# Patient Record
Sex: MALE | Race: BLACK OR AFRICAN AMERICAN | NOT HISPANIC OR LATINO | Employment: UNEMPLOYED | URBAN - METROPOLITAN AREA
[De-identification: names, ages, dates, MRNs, and addresses within clinical notes are randomized per-mention and may not be internally consistent; named-entity substitution may affect disease eponyms.]

---

## 2021-11-17 ENCOUNTER — MEDICAL CORRESPONDENCE (OUTPATIENT)
Dept: NEUROSURGERY | Facility: CLINIC | Age: 41
End: 2021-11-17

## 2021-11-24 ENCOUNTER — OFFICE VISIT (OUTPATIENT)
Dept: NEUROSURGERY | Facility: CLINIC | Age: 41
End: 2021-11-24
Payer: COMMERCIAL

## 2021-11-24 VITALS
HEART RATE: 60 BPM | WEIGHT: 215 LBS | SYSTOLIC BLOOD PRESSURE: 122 MMHG | HEIGHT: 70 IN | OXYGEN SATURATION: 97 % | DIASTOLIC BLOOD PRESSURE: 83 MMHG | BODY MASS INDEX: 30.78 KG/M2

## 2021-11-24 DIAGNOSIS — M48.02 SPINAL STENOSIS IN CERVICAL REGION: Primary | ICD-10-CM

## 2021-11-24 PROCEDURE — 99203 OFFICE O/P NEW LOW 30 MIN: CPT | Performed by: SURGERY

## 2021-11-24 RX ORDER — LEVALBUTEROL TARTRATE 45 UG/1
2 AEROSOL, METERED ORAL EVERY 4 HOURS PRN
COMMUNITY

## 2021-11-24 RX ORDER — ATORVASTATIN CALCIUM 10 MG/1
10 TABLET, FILM COATED ORAL
COMMUNITY

## 2021-11-24 ASSESSMENT — MIFFLIN-ST. JEOR: SCORE: 1868.54

## 2021-11-24 NOTE — PROGRESS NOTES
The the patient is a 41-year-old male.  He is a DOC patient.  He is here with 2 guards.  He was injured in July.  He was unconscious for few minutes.  He now has neck pain with symptoms going into both arms.  He gets tingling in his arms and hands.  He gets numbness.  He gets weakness, especially when he looks down. Symptoms are equal bilaterally.  He does not have trouble with his legs.  No issues with gait or balance.  No trouble with  bladder or bowel control.  On exam I do not find clonus or Babinski signs.  On MRI he has some central canal stenosis, to me not as bad as the radiologist reading.  The radiologist reads severe but I would be more inclined to say mild.  He also has foramen stenosis at multiple levels.  I would like to get some cervical spine films including oblique views and flexion-extension views.  I would like to get an EMG to be sure that were not dealing with  carpal tunnel or ulnar neuropathy and to pick cervical roots that need foraminotomies.  I would exhaust conservative treatment at the spine center.  I did show the MRI pictures to the patient.  He is satisfied with the plan.  Total time 30 minutes, more than 50% spent counseling and/or coordinating care.

## 2021-11-24 NOTE — NURSING NOTE
Neurosurgery consultation was requested by: LULA butcher   Pain: minor neck pain   Radicular Pain is present: bilateral arm pain   Lhermitte sign: no  Motor complaints: denies weakness  Sensory complaints: numbness in both hands , dropping items.   Gait and balance issues: yes   Bowel or bladder issues: denies   Duration of SX is: 4 months   The symptoms are worse with: activity   The symptoms are better with: rest  Injury: pt fell while playing basketball   Severity is: mild - moderate  Patient has tried the following conservative measures: none   NDI score is :   JSKAREN vizcarra

## 2021-11-24 NOTE — LETTER
11/24/2021         RE: Ino Rosa  [place] [id number] [facility] [address]        Dear Colleague,    Thank you for referring your patient, Ino Rosa, to the [facility]IC [facility]. Please see a copy of my visit note below.    The the patient is a 41-year-old male.  He is a DOC patient.  He is here with 2 guards.  He was injured in July.  He was unconscious for few minutes.  He now has neck pain with symptoms going into both arms.  He gets tingling in his arms and hands.  He gets numbness.  He gets weakness, especially when he looks down. Symptoms are equal bilaterally.  He does not have trouble with his legs.  No issues with gait or balance.  No trouble with  bladder or bowel control.  On exam I do not find clonus or Babinski signs.  On MRI he has some central canal stenosis, to me not as bad as the radiologist reading.  The radiologist reads severe but I would be more inclined to say mild.  He also has foramen stenosis at multiple levels.  I would like to get some cervical spine films including oblique views and flexion-extension views.  I would like to get an EMG to be sure that were not dealing with  carpal tunnel or ulnar neuropathy and to pick cervical roots that need foraminotomies.  I would exhaust conservative treatment at the spine center.  I did show the MRI pictures to the patient.  He is satisfied with the plan.  Total time 30 minutes, more than 50% spent counseling and/or coordinating care.      Again, thank you for allowing me to participate in the care of your patient.        Sincerely,        Parag Lacy MD

## 2022-01-03 ENCOUNTER — OFFICE VISIT (OUTPATIENT)
Dept: PHYSICAL MEDICINE AND REHAB | Facility: CLINIC | Age: 42
End: 2022-01-03
Payer: COMMERCIAL

## 2022-01-03 DIAGNOSIS — M48.02 SPINAL STENOSIS IN CERVICAL REGION: ICD-10-CM

## 2022-01-03 PROCEDURE — 95911 NRV CNDJ TEST 9-10 STUDIES: CPT | Performed by: PHYSICAL MEDICINE & REHABILITATION

## 2022-01-03 PROCEDURE — 95886 MUSC TEST DONE W/N TEST COMP: CPT | Mod: RT | Performed by: PHYSICAL MEDICINE & REHABILITATION

## 2022-01-03 NOTE — LETTER
1/3/2022         RE: Ino Rosa  [place] [id number] [facility] [address]        Dear Colleague,    Thank you for referring your patient, Ino Rosa, to the [facility] [place]. Please see a copy of my visit note below.    Patient presents at the request of Dr. Parag Lacy for a bilateral upper extremity EMG.  He has neck pain with bilateral hand numbness and tingling all digits involved mostly digits 4 and 5.  Right equal to left in severity.  This happened after an injury in July playing basketball where his feet were hit out from under him and he landed on his head.  This was while jumping.  Symptoms have improved some.  On exam he has normal sensation to light touch throughout the upper extremities, normal muscle strength of the major muscle groups of the upper extremities, and normal reflexes to the upper extremities with negative Boby's    EMG/NCS  results: Please see scanned full report    Comment NCS: Normal study  1.  Normal nerve conduction studies bilateral upper extremities.  This includes right median and ulnar SNAPs, bilateral median and ulnar transcarpal studies, and bilateral median and ulnar CMAPs.    Comment EMG: Normal study  1.  Normal needle EMG bilateral upper extremities.     Interpretation: Normal study    1. There is no electrodiagnostic evidence of cervical radiculopathy, brachial plexopathy, or focal neuropathy in the bilateral upper extremities.     Specifically, there is no electrodiagnostic evidence of ulnar neuropathy at the elbow or median neuropathy at the wrist in the bilateral upper extremities.    The testing was completed in its entirety by the physician.       It was our pleasure caring for your patient today, if there any questions or concerns please do not hesitate to contact us.        Again, thank you for allowing me to participate in the care of your patient.        Sincerely,        Dajuan Pedroza, DO

## 2022-01-03 NOTE — PATIENT INSTRUCTIONS
Thank you for choosing the [facility] for your EMG testing.    The ordering provider will receive your final EMG results within the next few days.  Please follow up with your provider for the results and further treatment recommendations.

## 2022-01-03 NOTE — PROGRESS NOTES
Patient presents at the request of Dr. Parag Lacy for a bilateral upper extremity EMG.  He has neck pain with bilateral hand numbness and tingling all digits involved mostly digits 4 and 5.  Right equal to left in severity.  This happened after an injury in July playing basketball where his feet were hit out from under him and he landed on his head.  This was while jumping.  Symptoms have improved some.  On exam he has normal sensation to light touch throughout the upper extremities, normal muscle strength of the major muscle groups of the upper extremities, and normal reflexes to the upper extremities with negative Boby's    EMG/NCS  results: Please see scanned full report    Comment NCS: Normal study  1.  Normal nerve conduction studies bilateral upper extremities.  This includes right median and ulnar SNAPs, bilateral median and ulnar transcarpal studies, and bilateral median and ulnar CMAPs.    Comment EMG: Normal study  1.  Normal needle EMG bilateral upper extremities.     Interpretation: Normal study    1. There is no electrodiagnostic evidence of cervical radiculopathy, brachial plexopathy, or focal neuropathy in the bilateral upper extremities.     Specifically, there is no electrodiagnostic evidence of ulnar neuropathy at the elbow or median neuropathy at the wrist in the bilateral upper extremities.    The testing was completed in its entirety by the physician.       It was our pleasure caring for your patient today, if there any questions or concerns please do not hesitate to contact us.

## 2022-03-08 ENCOUNTER — ANCILLARY PROCEDURE (OUTPATIENT)
Dept: RADIOLOGY | Facility: CLINIC | Age: 42
End: 2022-03-08
Payer: COMMERCIAL

## 2022-03-09 ENCOUNTER — OFFICE VISIT (OUTPATIENT)
Dept: NEUROSURGERY | Facility: CLINIC | Age: 42
End: 2022-03-09
Payer: COMMERCIAL

## 2022-03-09 VITALS
OXYGEN SATURATION: 96 % | DIASTOLIC BLOOD PRESSURE: 83 MMHG | HEART RATE: 62 BPM | HEIGHT: 70 IN | SYSTOLIC BLOOD PRESSURE: 140 MMHG | WEIGHT: 213 LBS | BODY MASS INDEX: 30.49 KG/M2

## 2022-03-09 DIAGNOSIS — R52 PAIN: Primary | ICD-10-CM

## 2022-03-09 PROCEDURE — 99213 OFFICE O/P EST LOW 20 MIN: CPT | Performed by: SURGERY

## 2022-03-09 NOTE — PROGRESS NOTES
The patient is a 41-year-old DOC patient.  He is here with 2 guards.  He complains of mild headaches and neck pain going out into his shoulders.  His hands are better.  He had some cervical spine films with oblique views and flexion-extension views.  He has cervical spondylosis especially at C3-4 and C6-7.  He does not sublux abnormally.  His EMG was negative including for carpal tunnel and ulnar nerve and cervical roots.  The patient and I discussed the foregoing.  He is satisfied with a conservative treatment plan.  Ordered.  Total time 15 minutes, more than 50% spent counseling and/or coordinating care.

## 2022-03-09 NOTE — LETTER
3/9/2022         RE: Ino Rosa  [place] [id number] [facility] [address]        Dear Colleague,    Thank you for referring your patient, Ino Rosa, to the [facility]. Please see a copy of my visit note below.    The patient is a 41-year-old DOC patient.  He is here with 2 guards.  He complains of mild headaches and neck pain going out into his shoulders.  His hands are better.  He had some cervical spine films with oblique views and flexion-extension views.  He has cervical spondylosis especially at C3-4 and C6-7.  He does not sublux abnormally.  His EMG was negative including for carpal tunnel and ulnar nerve and cervical roots.  The patient and I discussed the foregoing.  He is satisfied with a conservative treatment plan.  Ordered.  Total time 15 minutes, more than 50% spent counseling and/or coordinating care.      Again, thank you for allowing me to participate in the care of your patient.        Sincerely,        Parag Lacy MD

## 2022-03-09 NOTE — NURSING NOTE
Pt is a follow up for EMG. Pt has pain in his neck and both shoulders left is worse.pt has numbness and tingling both hands and left forearm. Pt denies any other symptoms.   Al Lawson MA

## 2022-06-14 ENCOUNTER — TRANSFERRED RECORDS (OUTPATIENT)
Dept: PHYSICAL MEDICINE AND REHAB | Facility: CLINIC | Age: 42
End: 2022-06-14

## 2022-07-15 NOTE — PROGRESS NOTES
ASSESSMENT: Ino Rosa is a 42 year old male with past medical history significant for uncomplicated asthma, type 2 diabetes mellitus, hyperlipidemia who presents today for new patient evaluation of chronic (right neck pain with radiation into the left greater than right upper extremity with associated numbness, tingling, weakness.  Pain began July 2021 after he fell while playing basketball, landing on his head and neck.  My review of an MRI cervical spine shows multilevel cervical spondylosis.  There is up to moderate to severe spinal canal stenosis C4-5.  There is multilevel foraminal stenosis which is severe right C3-4, moderate right and moderate to severe left C4-5, severe left and moderate to severe right C6-7, moderate left C7-T1.  EMG of bilateral upper extremities was normal.  Pain is most consistent with cervical radiculitis.  On exam he reported a sensory deficit left finger 4.  Otherwise, he was neurologically intact.        PLAN:  A shared decision making model was used.  The patient's values and choices were respected.  The following represents what was discussed and decided upon by the physician assistant and the patient.  2 guards were present with the patient.    1.  DIAGNOSTIC TESTS:    -I reviewed the MRI lumbar spine.  - I reviewed cervical spine flexion-extension x-rays.  - I reviewed the EMG bilateral upper extremities.  - No further diagnostic tests were ordered.    2.  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  He does home exercises.  No further physical therapy was ordered.    3.  MEDICATIONS: No changes are made to the patient's medications.  Patient takes naproxen and Tylenol about 5 times per week.  These medications are both helpful, but naproxen helps more.    4.  INTERVENTIONS:    - I offer the patient a C7-T1 interlaminar epidural steroid injection.  Patient indicated he would like to proceed and an order was placed.    5.  PATIENT  EDUCATION:  - I told the patient if he fails to improve with conservative treatment I would recommend following up with Dr. Lacy.    6.  FOLLOW-UP:   Patient should follow-up with me 2 weeks after his C7-T1 interlaminar epidural steroid injection.  If he has questions or concerns in the meantime, he should not hesitate to call.        SUBJECTIVE:  Ino Rosa  Is a 42 year old male who presents today in consultation at the request of Dr. Lacy for new patient evaluation of neck pain with radiation into the left greater than right upper extremity with associated numbness, tingling, weakness.  Patient reports that in July 2021 he fell while playing basketball.  He states that his legs were taken out from under him and he fell backwards, landing on his head and neck.  He did have a loss of consciousness.  He had immediate neck pain.  He reports he lost feeling in both arms which lasted for about 2 weeks with significant weakness.  He has had gradual improvement in numbness, tingling, and weakness since then, but symptoms persist.    Patient complains of left greater than right neck pain.  He states that initially he felt pain radiating down the triceps areas although that area does not bother him as much now.  He still experiences pain in the left greater than right radial forearm.  He has numbness and tingling in left greater than right fingers 1, 3, 4.  He feels weakness with left .  He experiences headaches associated with the neck pain about twice per week.  He has had some dizziness and difficulty concentrating since the injury as well.  Pain is aggravated with sleeping, especially on his left side and looking down for too long.  Patient states that he is an avid reader and the prolonged cervical flexion makes pain worse.  Pain is alleviated with resting the left arm.  He denies loss of bowel or bladder control.  Denies recent fevers, chills, sweats.    Patient gets physical therapy at his  correctional facility.  He believes he has had 2 sessions so far.  He does home exercise program.  He did not go to the chiropractor.  He has not had any cervical spine surgery or cervical spine injections.  He states he did have a lumbar spine injection last year which did not help.  He takes naproxen and Tylenol without 5 times per week.  His medications are both helpful.  Naproxen is more helpful than Tylenol.    Current Outpatient Medications   Medication     atorvastatin (LIPITOR) 10 MG tablet     ciclesonide (ALVESCO) 80 MCG/ACT inhaler     levalbuterol (XOPENEX HFA) 45 MCG/ACT inhaler     metFORMIN (GLUCOPHAGE) 500 MG tablet         No Known Allergies    Past Medical History:   Diagnosis Date     Chronic pain      Hyperlipidemia      Type 2 diabetes mellitus (H)      Uncomplicated asthma         Surgical history: ORIF arm, dental work    Family history: Grandmother had cancer, mother had diabetes    Social history: Patient is a DOC patient.  He denies tobacco, alcohol, illicit drug use.    ROS: Positive for headache, eye pain, imbalance, dizziness, excessive tiredness.  Specifically negative for dysphagia, imbalance, fine motor skill difficulties, bowel/bladder dysfunction, fevers,chills, appetite changes, unexplained weight loss.   Otherwise 13 systems reviewed are negative.  Please see the patient's intake questionnaire from today for details.      OBJECTIVE:  PHYSICAL EXAMINATION:  CONSTITUTIONAL:  Vital signs as above.  No acute distress.  The patient is well nourished and well groomed.  Patient is cuffed at wrists and ankles.  PSYCHIATRIC:  The patient is awake, alert, oriented to person, place, time and answering questions appropriately with clear speech.    HEENT:  Normocephalic, atraumatic.  Sclera clear.    SKIN:  Skin over the face, bilateral upper extremities, and neck is clean, dry, intact without rashes.  LYMPH NODES:  No palpable or tender anterior/posterior cervical, submandibular, or  supraclavicular lymph nodes.    MUSCLE STRENGTH:  5/5 strength for the bilateral shoulder abductors, elbow flexors/extensors, wrist extensors, finger flexors/abductors.  NEURO:  CN III-XII are grossly intact.  2+ symmetric biceps, brachioradialis, triceps reflexes bilaterally.  Sensation to light touch subjectively diminished distal left finger 4.  Negative Hendricks's bilaterally.    VASCULAR:  2/4 radial pulses bilaterally.  Warm upper limbs bilaterally.  Capillary refill in the upper extremities is less than 1 second.  MUSCULOSKELETAL: Tender palpation midline at approximately C5.  Mild hypertonicity left upper trapezius muscles.  Cervical range of motion is mildly restricted with lateral rotation right, otherwise full.    RESULTS:    I reviewed the MRI cervical spine from [facility] dated July 22, 2022.  At C3-4 there is severe right foraminal stenosis.  At C4-5 there is moderate to severe spinal canal stenosis with flattening of the cord.  There is moderate right and mild to moderate left foraminal stenosis.  At C5-6 there is mild to moderate left foraminal stenosis.  At C6-7 there is moderate spinal canal stenosis with flattening of the left hemicord.  There is severe left and moderate to severe right foraminal stenosis.  At C7-T1 there is moderate left foraminal stenosis.    I reviewed flexion-extension x-rays of the cervical spine from [facility] dated March 8, 2022.  This shows straightening of normal cervical lordosis.  There is no pathologic subluxation.    EMG bilateral upper extremities January 3, 2022:  Comment NCS: Normal study  1.  Normal nerve conduction studies bilateral upper extremities.  This includes right median and ulnar SNAPs, bilateral median and ulnar transcarpal studies, and bilateral median and ulnar CMAPs.     Comment EMG: Normal study  1.  Normal needle EMG bilateral upper extremities.      Interpretation: Normal study     1. There is no electrodiagnostic evidence of cervical radiculopathy,  brachial plexopathy, or focal neuropathy in the bilateral upper extremities.     Specifically, there is no electrodiagnostic evidence of ulnar neuropathy at the elbow or median neuropathy at the wrist in the bilateral upper extremities.

## 2022-07-18 ENCOUNTER — MEDICAL CORRESPONDENCE (OUTPATIENT)
Dept: PHYSICAL MEDICINE AND REHAB | Facility: CLINIC | Age: 42
End: 2022-07-18

## 2022-07-18 ENCOUNTER — MEDICAL CORRESPONDENCE (OUTPATIENT)
Dept: HEALTH INFORMATION MANAGEMENT | Facility: CLINIC | Age: 42
End: 2022-07-18

## 2022-07-18 ENCOUNTER — OFFICE VISIT (OUTPATIENT)
Dept: PHYSICAL MEDICINE AND REHAB | Facility: CLINIC | Age: 42
End: 2022-07-18
Payer: COMMERCIAL

## 2022-07-18 VITALS
WEIGHT: 213 LBS | HEART RATE: 51 BPM | SYSTOLIC BLOOD PRESSURE: 132 MMHG | DIASTOLIC BLOOD PRESSURE: 72 MMHG | BODY MASS INDEX: 30.49 KG/M2 | HEIGHT: 70 IN

## 2022-07-18 DIAGNOSIS — R52 PAIN: ICD-10-CM

## 2022-07-18 DIAGNOSIS — M54.12 CERVICAL RADICULAR PAIN: Primary | ICD-10-CM

## 2022-07-18 PROCEDURE — 99204 OFFICE O/P NEW MOD 45 MIN: CPT | Performed by: PHYSICIAN ASSISTANT

## 2022-07-18 ASSESSMENT — PAIN SCALES - GENERAL: PAINLEVEL: MILD PAIN (2)

## 2022-07-18 NOTE — LETTER
7/18/2022         RE: Ino Rosa  [place] [id number] [facility] [id number]        Dear Colleague,    Thank you for referring your patient, Ino Rosa, to the [facility]. Please see a copy of my visit note below.    ASSESSMENT: Ino Rosa is a 42 year old male with past medical history significant for uncomplicated asthma, type 2 diabetes mellitus, hyperlipidemia who presents today for new patient evaluation of chronic (right neck pain with radiation into the left greater than right upper extremity with associated numbness, tingling, weakness.  Pain began July 2021 after he fell while playing basketball, landing on his head and neck.  My review of an MRI cervical spine shows multilevel cervical spondylosis.  There is up to moderate to severe spinal canal stenosis C4-5.  There is multilevel foraminal stenosis which is severe right C3-4, moderate right and moderate to severe left C4-5, severe left and moderate to severe right C6-7, moderate left C7-T1.  EMG of bilateral upper extremities was normal.  Pain is most consistent with cervical radiculitis.  On exam he reported a sensory deficit left finger 4.  Otherwise, he was neurologically intact.        PLAN:  A shared decision making model was used.  The patient's values and choices were respected.  The following represents what was discussed and decided upon by the physician assistant and the patient.  2 guards were present with the patient.    1.  DIAGNOSTIC TESTS:    -I reviewed the MRI lumbar spine.  - I reviewed cervical spine flexion-extension x-rays.  - I reviewed the EMG bilateral upper extremities.  - No further diagnostic tests were ordered.    2.  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  He does home exercises.  No further physical therapy was ordered.    3.  MEDICATIONS: No changes are made to the patient's medications.  Patient takes naproxen and Tylenol about  5 times per week.  These medications are both helpful, but naproxen helps more.    4.  INTERVENTIONS:    - I offer the patient a C7-T1 interlaminar epidural steroid injection.  Patient indicated he would like to proceed and an order was placed.    5.  PATIENT EDUCATION:  - I told the patient if he fails to improve with conservative treatment I would recommend following up with Dr. Lacy.    6.  FOLLOW-UP:   Patient should follow-up with me 2 weeks after his C7-T1 interlaminar epidural steroid injection.  If he has questions or concerns in the meantime, he should not hesitate to call.        SUBJECTIVE:  Ino Rosa  Is a 42 year old male who presents today in consultation at the request of Dr. Lacy for new patient evaluation of neck pain with radiation into the left greater than right upper extremity with associated numbness, tingling, weakness.  Patient reports that in July 2021 he fell while playing basketball.  He states that his legs were taken out from under him and he fell backwards, landing on his head and neck.  He did have a loss of consciousness.  He had immediate neck pain.  He reports he lost feeling in both arms which lasted for about 2 weeks with significant weakness.  He has had gradual improvement in numbness, tingling, and weakness since then, but symptoms persist.    Patient complains of left greater than right neck pain.  He states that initially he felt pain radiating down the triceps areas although that area does not bother him as much now.  He still experiences pain in the left greater than right radial forearm.  He has numbness and tingling in left greater than right fingers 1, 3, 4.  He feels weakness with left .  He experiences headaches associated with the neck pain about twice per week.  He has had some dizziness and difficulty concentrating since the injury as well.  Pain is aggravated with sleeping, especially on his left side and looking down for too long.  Patient states  that he is an avid reader and the prolonged cervical flexion makes pain worse.  Pain is alleviated with resting the left arm.  He denies loss of bowel or bladder control.  Denies recent fevers, chills, sweats.    Patient gets physical therapy at his correctional facility.  He believes he has had 2 sessions so far.  He does home exercise program.  He did not go to the chiropractor.  He has not had any cervical spine surgery or cervical spine injections.  He states he did have a lumbar spine injection last year which did not help.  He takes naproxen and Tylenol without 5 times per week.  His medications are both helpful.  Naproxen is more helpful than Tylenol.    Current Outpatient Medications   Medication     atorvastatin (LIPITOR) 10 MG tablet     ciclesonide (ALVESCO) 80 MCG/ACT inhaler     levalbuterol (XOPENEX HFA) 45 MCG/ACT inhaler     metFORMIN (GLUCOPHAGE) 500 MG tablet         No Known Allergies    Past Medical History:   Diagnosis Date     Chronic pain      Hyperlipidemia      Type 2 diabetes mellitus (H)      Uncomplicated asthma         Surgical history: ORIF arm, dental work    Family history: Grandmother had cancer, mother had diabetes    Social history: Patient is a [employer] patient.  He denies tobacco, alcohol, illicit drug use.    ROS: Positive for headache, eye pain, imbalance, dizziness, excessive tiredness.  Specifically negative for dysphagia, imbalance, fine motor skill difficulties, bowel/bladder dysfunction, fevers,chills, appetite changes, unexplained weight loss.   Otherwise 13 systems reviewed are negative.  Please see the patient's intake questionnaire from today for details.      OBJECTIVE:  PHYSICAL EXAMINATION:  CONSTITUTIONAL:  Vital signs as above.  No acute distress.  The patient is well nourished and well groomed.  Patient is cuffed at wrists and ankles.  PSYCHIATRIC:  The patient is awake, alert, oriented to person, place, time and answering questions appropriately with clear speech.     HEENT:  Normocephalic, atraumatic.  Sclera clear.    SKIN:  Skin over the face, bilateral upper extremities, and neck is clean, dry, intact without rashes.  LYMPH NODES:  No palpable or tender anterior/posterior cervical, submandibular, or supraclavicular lymph nodes.    MUSCLE STRENGTH:  5/5 strength for the bilateral shoulder abductors, elbow flexors/extensors, wrist extensors, finger flexors/abductors.  NEURO:  CN III-XII are grossly intact.  2+ symmetric biceps, brachioradialis, triceps reflexes bilaterally.  Sensation to light touch subjectively diminished distal left finger 4.  Negative Hendricks's bilaterally.    VASCULAR:  2/4 radial pulses bilaterally.  Warm upper limbs bilaterally.  Capillary refill in the upper extremities is less than 1 second.  MUSCULOSKELETAL: Tender palpation midline at approximately C5.  Mild hypertonicity left upper trapezius muscles.  Cervical range of motion is mildly restricted with lateral rotation right, otherwise full.    RESULTS:    I reviewed the MRI cervical spine from [facility] dated July 22, 2022.  At C3-4 there is severe right foraminal stenosis.  At C4-5 there is moderate to severe spinal canal stenosis with flattening of the cord.  There is moderate right and mild to moderate left foraminal stenosis.  At C5-6 there is mild to moderate left foraminal stenosis.  At C6-7 there is moderate spinal canal stenosis with flattening of the left hemicord.  There is severe left and moderate to severe right foraminal stenosis.  At C7-T1 there is moderate left foraminal stenosis.    I reviewed flexion-extension x-rays of the cervical spine from [facility] dated March 8, 2022.  This shows straightening of normal cervical lordosis.  There is no pathologic subluxation.    EMG bilateral upper extremities January 3, 2022:  Comment NCS: Normal study  1.  Normal nerve conduction studies bilateral upper extremities.  This includes right median and ulnar SNAPs, bilateral median and ulnar  transcarpal studies, and bilateral median and ulnar CMAPs.     Comment EMG: Normal study  1.  Normal needle EMG bilateral upper extremities.      Interpretation: Normal study     1. There is no electrodiagnostic evidence of cervical radiculopathy, brachial plexopathy, or focal neuropathy in the bilateral upper extremities.     Specifically, there is no electrodiagnostic evidence of ulnar neuropathy at the elbow or median neuropathy at the wrist in the bilateral upper extremities.             Again, thank you for allowing me to participate in the care of your patient.        Sincerely,        Odessa Alaniz PA-C

## 2022-08-31 ENCOUNTER — TRANSCRIBE ORDERS (OUTPATIENT)
Dept: PHYSICAL MEDICINE AND REHAB | Facility: CLINIC | Age: 42
End: 2022-08-31

## 2022-08-31 DIAGNOSIS — M54.12 CERVICAL RADICULAR PAIN: Primary | ICD-10-CM

## 2022-09-27 ENCOUNTER — RADIOLOGY INJECTION OFFICE VISIT (OUTPATIENT)
Dept: PHYSICAL MEDICINE AND REHAB | Facility: CLINIC | Age: 42
End: 2022-09-27
Attending: PHYSICIAN ASSISTANT
Payer: COMMERCIAL

## 2022-09-27 VITALS
RESPIRATION RATE: 16 BRPM | HEART RATE: 75 BPM | DIASTOLIC BLOOD PRESSURE: 68 MMHG | TEMPERATURE: 98.3 F | SYSTOLIC BLOOD PRESSURE: 126 MMHG | OXYGEN SATURATION: 98 %

## 2022-09-27 DIAGNOSIS — E11.9 DIABETES MELLITUS, TYPE 2 (H): Primary | ICD-10-CM

## 2022-09-27 DIAGNOSIS — M54.12 CERVICAL RADICULAR PAIN: ICD-10-CM

## 2022-09-27 LAB — GLUCOSE SERPL-MCNC: 139 MG/DL (ref 70–99)

## 2022-09-27 PROCEDURE — 62321 NJX INTERLAMINAR CRV/THRC: CPT | Performed by: PHYSICAL MEDICINE & REHABILITATION

## 2022-09-27 PROCEDURE — 82962 GLUCOSE BLOOD TEST: CPT | Performed by: PHYSICAL MEDICINE & REHABILITATION

## 2022-09-27 RX ORDER — LIDOCAINE HYDROCHLORIDE 10 MG/ML
INJECTION, SOLUTION EPIDURAL; INFILTRATION; INTRACAUDAL; PERINEURAL
Status: COMPLETED | OUTPATIENT
Start: 2022-09-27 | End: 2022-09-27

## 2022-09-27 RX ORDER — METHYLPREDNISOLONE ACETATE 80 MG/ML
INJECTION, SUSPENSION INTRA-ARTICULAR; INTRALESIONAL; INTRAMUSCULAR; SOFT TISSUE
Status: COMPLETED | OUTPATIENT
Start: 2022-09-27 | End: 2022-09-27

## 2022-09-27 RX ADMIN — METHYLPREDNISOLONE ACETATE 80 MG: 80 INJECTION, SUSPENSION INTRA-ARTICULAR; INTRALESIONAL; INTRAMUSCULAR; SOFT TISSUE at 14:27

## 2022-09-27 RX ADMIN — LIDOCAINE HYDROCHLORIDE 2.5 ML: 10 INJECTION, SOLUTION EPIDURAL; INFILTRATION; INTRACAUDAL; PERINEURAL at 14:26

## 2022-09-27 ASSESSMENT — PAIN SCALES - GENERAL
PAINLEVEL: MILD PAIN (3)
PAINLEVEL: MILD PAIN (2)

## 2022-09-27 NOTE — PATIENT INSTRUCTIONS
Follow-up visit with Odessa Alaniz in 2 weeks to discuss injection outcome and determine care plan going forward.    Please be advised that your blood glucose levels may be increased for the next 5-7 days as a result of the steroid you received with your injection today.  It is recommended that you monitor your blood glucose levels closely over the next week and direct any additional questions regarding your blood glucose management to your primary doctor.       DISCHARGE INSTRUCTIONS    During office hours (8:00 a.m.- 4:00 p.m.) questions or concerns may be answered  by calling [facility] Navigation Nurses at  [phone].  Messages received after hours will be returned the following business day.      In the case of an emergency, please dial 911 or seek assistance at the nearest Emergency Room/Urgent Care facility.     All Patients:    You may experience an increase in your symptoms for the first 2 days (It may take anywhere between 2 days- 2 weeks for the steroid to have maximum effect).    You may use ice on the injection site, as frequently as 20 minutes each hour if needed.    You may take your pain medicine.    You may continue taking your regular medication after your injection. If you have had a Medial Branch Block you may resume pain medication once your pain diary is completed.    You may shower. No swimming, tub bath or hot tub for 48 hours.  You may remove your bandaid/bandage as soon as you are home.    You may resume light activities, as tolerated.    Resume your usual diet as tolerated.    It is strongly advised that you do not drive for 1-3 hours post injection.    If you have had oral sedation:  Do not drive for 8 hours post injection.      If you have had IV sedation:  Do not drive for 24 hours post injection.  Do not operate hazardous machinery or make important personal/business decisions for 24 hours.      POSSIBLE STEROID SIDE EFFECTS (If steroid/cortisone was used for your procedure)    -If  you experience these symptoms, it should only last for a short period    Swelling of the legs              Skin redness (flushing)     Mouth (oral) irritation   Blood sugar (glucose) levels            Sweats                    Mood changes  Headache  Sleeplessness  Weakened immune system for up to 14 days, which could increase the risk of ashlee the COVID-19 virus and/or experiencing more severe symptoms of the disease, if exposed.  Decreased effectiveness of the flu vaccine if given within 2 weeks of the steroid.         POSSIBLE PROCEDURE SIDE EFFECTS  -Call the Spine Center if you are concerned  Increased Pain           Increased numbness/tingling      Nausea/Vomiting          Bruising/bleeding at site      Redness or swelling                                              Difficulty walking      Weakness           Fever greater than 100.5    *In the event of a severe headache after an epidural steroid injection that is relieved by lying down, please call the [facility] to speak with a clinical staff member*

## 2022-10-10 NOTE — PROGRESS NOTES
Assessment:   Ino Rosa is a 42 year old y.o. male with past medical history significant for uncomplicated asthma, type 2 diabetes mellitus, hyperlipidemia  who presents today for follow-up regarding  chronic left greater than right neck pain with radiation into the left greater than right upper extremity with associated numbness, tingling, weakness.  Pain began July 2021 after he fell while playing basketball, landing on his head and neck.  My review of an MRI cervical spine shows multilevel cervical spondylosis.  There is up to moderate to severe spinal canal stenosis C4-5.  There is multilevel foraminal stenosis which is severe right C3-4, moderate right and moderate to severe left C4-5, severe left and moderate to severe right C6-7, moderate left C7-T1.    Patient is status post a C7-T1 interlaminar epidural steroid injection September 27, 2022 which provided greater than 50% relief of his pain overall.  Arm symptoms have resolved completely.  He has minimal residual midline neck pain.  He is neuro intact.       Plan:     A shared decision making plan was used.  The patient's values and choices were respected.  The following represents what was discussed and decided upon by the physician assistant and the patient.   2 guards were present with the patient.    1.  DIAGNOSTIC TESTS:    -I reviewed the MRI lumbar spine.  - I reviewed cervical spine flexion-extension x-rays.  - I reviewed the EMG bilateral upper extremities.  - No further diagnostic tests were ordered.    2.  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  He does home exercises.  No further physical therapy was ordered.    3.  MEDICATIONS:  No changes are made to the patient's medications.  Patient takes naproxen approximately twice per week.  He has not needed any Tylenol since his injection.  - I did make a note that patient would benefit from having a memory foam pillow to support his neck at night.    4.   INTERVENTIONS: No additional interventions were ordered.  If pain returns, we could repeat the C7-T1 interlaminar epidural steroid injection.    5.  PATIENT EDUCATION: If pain ultimately fails to improve with nonsurgical management, would recommend following up with Dr. Lacy.    6.  FOLLOW-UP: Patient will follow up with me as needed.  If he has questions or concerns, he should not hesitate to call.    Subjective:     Ino Rosa is a 42 year old male who presents today for follow-up regarding neck pain with radiation into the left greater than right upper extremity with associated numbness, tingling, weakness.  Patient is following up after a C7-T1 interlaminar epidural steroid injection September 27, 2022.  Patient reports greater than 50% improvement in his pain.    Patient complains of minimal residual midline neck pain.  Patient reports that for the past 2 days his pain has been more severe because he is in a different cell where he has to constantly turn his neck to the right.  He states that the pain down the arms has resolved completely.  Numbness and tingling has resolved completely.  He no longer feels any weakness in his arms.  He is not dropping items.  He rates his pain today as a 3 out of 10.  At its worst it is an 8 of 10.  At its best it is a 0-10.  Pain is aggravated with looking in one direction for too long.  Pain is alleviated with stretches and movement.    Patient gets physical therapy at his correctional facility although this happens seldomly.  He does do his home exercises on his own every day.  He takes naproxen about twice per week.  He did need to take it both yesterday and today.  He has not taken any Tylenol since the injection.    Review of Systems:  Positive for pain much worse at night.  Negative for numbness/tingling, weakness, loss of bowel/bladder control, inability to urinate, headache, trip/stumble/falls, difficulty swallowing, difficulty with hand skills, fevers,  unintentional weight loss.     Objective:   CONSTITUTIONAL:  Vital signs as above.  No acute distress.  The patient is well nourished and well groomed.    PSYCHIATRIC:  The patient is awake, alert, oriented to person, place and time.  The patient is answering questions appropriately with clear speech.  Normal affect.  HEENT: Normocephalic, atraumatic.  Sclera clear.    LYMPH: No cervical lymphadenopathy  SKIN:  Skin over the face, neck bilateral upper extremities is clean, dry, intact without rashes.  MUSCULOSKELETAL: No tenderness to palpation midline cervical spine.  No tenderness palpation bilateral cervical paraspinous muscles.  No tenderness palpation bilateral upper trapezius muscles.  The patient has 5/5 strength for the bilateral shoulder abductors, elbow flexors/extensors, wrist extensors, finger flexors/abductors.   NEUROLOGICAL:    Negative Hendricks's bilaterally.  Sensation to light touch is intact bilateral upper extremities throughout.    RESULTS:    I reviewed the MRI cervical spine from [facility] dated July 22, 2022.  At C3-4 there is severe right foraminal stenosis.  At C4-5 there is moderate to severe spinal canal stenosis with flattening of the cord.  There is moderate right and mild to moderate left foraminal stenosis.  At C5-6 there is mild to moderate left foraminal stenosis.  At C6-7 there is moderate spinal canal stenosis with flattening of the left hemicord.  There is severe left and moderate to severe right foraminal stenosis.  At C7-T1 there is moderate left foraminal stenosis.     I reviewed flexion-extension x-rays of the cervical spine from [facility] dated March 8, 2022.  This shows straightening of normal cervical lordosis.  There is no pathologic subluxation.     EMG bilateral upper extremities January 3, 2022:  Comment NCS: Normal study  1.  Normal nerve conduction studies bilateral upper extremities.  This includes right median and ulnar SNAPs, bilateral median and ulnar transcarpal  studies, and bilateral median and ulnar CMAPs.     Comment EMG: Normal study  1.  Normal needle EMG bilateral upper extremities.      Interpretation: Normal study     1. There is no electrodiagnostic evidence of cervical radiculopathy, brachial plexopathy, or focal neuropathy in the bilateral upper extremities.     Specifically, there is no electrodiagnostic evidence of ulnar neuropathy at the elbow or median neuropathy at the wrist in the bilateral upper extremities.

## 2022-10-12 ENCOUNTER — OFFICE VISIT (OUTPATIENT)
Dept: PHYSICAL MEDICINE AND REHAB | Facility: CLINIC | Age: 42
End: 2022-10-12
Payer: COMMERCIAL

## 2022-10-12 VITALS
HEART RATE: 65 BPM | BODY MASS INDEX: 30.49 KG/M2 | WEIGHT: 213 LBS | SYSTOLIC BLOOD PRESSURE: 175 MMHG | DIASTOLIC BLOOD PRESSURE: 90 MMHG | HEIGHT: 70 IN

## 2022-10-12 DIAGNOSIS — M48.02 SPINAL STENOSIS IN CERVICAL REGION: Primary | ICD-10-CM

## 2022-10-12 PROCEDURE — 99213 OFFICE O/P EST LOW 20 MIN: CPT | Performed by: PHYSICIAN ASSISTANT

## 2022-10-12 ASSESSMENT — PAIN SCALES - GENERAL: PAINLEVEL: MILD PAIN (3)

## 2022-10-12 NOTE — LETTER
10/12/2022         RE: Ino Rosa  [place] [id number] [facility] [id number]        Dear Colleague,    Thank you for referring your patient, Ino Rosa, to the [facility]. Please see a copy of my visit note below.    Assessment:   Ino Rosa is a 42 year old y.o. male with past medical history significant for uncomplicated asthma, type 2 diabetes mellitus, hyperlipidemia  who presents today for follow-up regarding  chronic left greater than right neck pain with radiation into the left greater than right upper extremity with associated numbness, tingling, weakness.  Pain began July 2021 after he fell while playing basketball, landing on his head and neck.  My review of an MRI cervical spine shows multilevel cervical spondylosis.  There is up to moderate to severe spinal canal stenosis C4-5.  There is multilevel foraminal stenosis which is severe right C3-4, moderate right and moderate to severe left C4-5, severe left and moderate to severe right C6-7, moderate left C7-T1.    Patient is status post a C7-T1 interlaminar epidural steroid injection September 27, 2022 which provided greater than 50% relief of his pain overall.  Arm symptoms have resolved completely.  He has minimal residual midline neck pain.  He is neuro intact.       Plan:     A shared decision making plan was used.  The patient's values and choices were respected.  The following represents what was discussed and decided upon by the physician assistant and the patient.   2 guards were present with the patient.    1.  DIAGNOSTIC TESTS:    -I reviewed the MRI lumbar spine.  - I reviewed cervical spine flexion-extension x-rays.  - I reviewed the EMG bilateral upper extremities.  - No further diagnostic tests were ordered.    2.  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  He does home exercises.  No further physical therapy was ordered.    3.  MEDICATIONS:  No  changes are made to the patient's medications.  Patient takes naproxen approximately twice per week.  He has not needed any Tylenol since his injection.  - I did make a note that patient would benefit from having a memory foam pillow to support his neck at night.    4.  INTERVENTIONS: No additional interventions were ordered.  If pain returns, we could repeat the C7-T1 interlaminar epidural steroid injection.    5.  PATIENT EDUCATION: If pain ultimately fails to improve with nonsurgical management, would recommend following up with Dr. Lacy.    6.  FOLLOW-UP: Patient will follow up with me as needed.  If he has questions or concerns, he should not hesitate to call.    Subjective:     Ino Rosa is a 42 year old male who presents today for follow-up regarding neck pain with radiation into the left greater than right upper extremity with associated numbness, tingling, weakness.  Patient is following up after a C7-T1 interlaminar epidural steroid injection September 27, 2022.  Patient reports greater than 50% improvement in his pain.    Patient complains of minimal residual midline neck pain.  Patient reports that for the past 2 days his pain has been more severe because he is in a different cell where he has to constantly turn his neck to the right.  He states that the pain down the arms has resolved completely.  Numbness and tingling has resolved completely.  He no longer feels any weakness in his arms.  He is not dropping items.  He rates his pain today as a 3 out of 10.  At its worst it is an 8 of 10.  At its best it is a 0-10.  Pain is aggravated with looking in one direction for too long.  Pain is alleviated with stretches and movement.    Patient gets physical therapy at his correctional facility although this happens seldomly.  He does do his home exercises on his own every day.  He takes naproxen about twice per week.  He did need to take it both yesterday and today.  He has not taken any Tylenol since  the injection.    Review of Systems:  Positive for pain much worse at night.  Negative for numbness/tingling, weakness, loss of bowel/bladder control, inability to urinate, headache, trip/stumble/falls, difficulty swallowing, difficulty with hand skills, fevers, unintentional weight loss.     Objective:   CONSTITUTIONAL:  Vital signs as above.  No acute distress.  The patient is well nourished and well groomed.    PSYCHIATRIC:  The patient is awake, alert, oriented to person, place and time.  The patient is answering questions appropriately with clear speech.  Normal affect.  HEENT: Normocephalic, atraumatic.  Sclera clear.    LYMPH: No cervical lymphadenopathy  SKIN:  Skin over the face, neck bilateral upper extremities is clean, dry, intact without rashes.  MUSCULOSKELETAL: No tenderness to palpation midline cervical spine.  No tenderness palpation bilateral cervical paraspinous muscles.  No tenderness palpation bilateral upper trapezius muscles.  The patient has 5/5 strength for the bilateral shoulder abductors, elbow flexors/extensors, wrist extensors, finger flexors/abductors.   NEUROLOGICAL:    Negative Hendricks's bilaterally.  Sensation to light touch is intact bilateral upper extremities throughout.    RESULTS:    I reviewed the MRI cervical spine from [facility] dated July 22, 2022.  At C3-4 there is severe right foraminal stenosis.  At C4-5 there is moderate to severe spinal canal stenosis with flattening of the cord.  There is moderate right and mild to moderate left foraminal stenosis.  At C5-6 there is mild to moderate left foraminal stenosis.  At C6-7 there is moderate spinal canal stenosis with flattening of the left hemicord.  There is severe left and moderate to severe right foraminal stenosis.  At C7-T1 there is moderate left foraminal stenosis.     I reviewed flexion-extension x-rays of the cervical spine from [facility] dated March 8, 2022.  This shows straightening of normal cervical lordosis.   There is no pathologic subluxation.     EMG bilateral upper extremities January 3, 2022:  Comment NCS: Normal study  1.  Normal nerve conduction studies bilateral upper extremities.  This includes right median and ulnar SNAPs, bilateral median and ulnar transcarpal studies, and bilateral median and ulnar CMAPs.     Comment EMG: Normal study  1.  Normal needle EMG bilateral upper extremities.      Interpretation: Normal study     1. There is no electrodiagnostic evidence of cervical radiculopathy, brachial plexopathy, or focal neuropathy in the bilateral upper extremities.     Specifically, there is no electrodiagnostic evidence of ulnar neuropathy at the elbow or median neuropathy at the wrist in the bilateral upper extremities.              Again, thank you for allowing me to participate in the care of your patient.        Sincerely,        Odessa Alaniz PA-C